# Patient Record
(demographics unavailable — no encounter records)

---

## 2024-11-22 NOTE — PHYSICAL EXAM
[Delayed in the Right Toes] : capillary refills normal in right toes [Delayed in the Left Toes] : capillary refills normal in the left toes [FreeTextEntry3] : Hair growth noted on digits. Proximal to distal cooling is within normal limits.  [FreeTextEntry1] : [place]-Pat monofilament testing absent at the hallux, 1st MPJ and heel bilateral.

## 2024-11-22 NOTE — ASSESSMENT
[FreeTextEntry1] : Impression: Diabetes with neuropathy. Skin fissures. Onychomycosis.   Treatment: I manually and mechanically debrided hallux mycotic nails using a small straight nail splitter and rotary kalen. The nails were aggressively debrided and debulked to make them comfortable in shoe gear. I applied Naftin gel and encouraged him to utilize OTC antifungals. I prepped the foot with alcohol and debrided fissures without incident. I discussed diabetic foot care and diabetic neuropathy and the fact that he is a fall risk. He is having some loose skin eye issues, so I gave him a Plastics surgical referral. He is going to follow-up. He should use moisturizer daily and follow-up in the office for evaluation.

## 2024-11-22 NOTE — PHYSICAL EXAM
[Delayed in the Right Toes] : capillary refills normal in right toes [Delayed in the Left Toes] : capillary refills normal in the left toes [FreeTextEntry3] : Hair growth noted on digits. Proximal to distal cooling is within normal limits.  [FreeTextEntry1] : [place]-Pat monofilament testing absent at the hallux, 1st MPJ and heel bilateral.

## 2024-11-22 NOTE — PHYSICAL EXAM
[Delayed in the Right Toes] : capillary refills normal in right toes [Delayed in the Left Toes] : capillary refills normal in the left toes [FreeTextEntry3] : Hair growth noted on digits. Proximal to distal cooling is within normal limits.  [FreeTextEntry1] : [place]-Pat monofilament testing absent at the hallux, 1st MPJ and heel bilateral.

## 2024-11-22 NOTE — HISTORY OF PRESENT ILLNESS
[FreeTextEntry1] : Patient presents today for diabetic foot care. He has diabetic neuropathy. It is getting worse. He has fissures that are getting worse, dystrophic and mycotic nails.

## 2025-07-07 NOTE — PHYSICAL EXAM
[1+] : left foot dorsalis pedis 1+ [Vibration Dec.] : diminished vibratory sensation at the level of the toes [Position Sense Dec.] : diminished position sense at the level of the toes [Diminished Throughout Right Foot] : diminished sensation with monofilament testing throughout right foot [Diminished Throughout Left Foot] : diminished sensation with monofilament testing throughout left foot [Delayed in the Right Toes] : capillary refills normal in right toes [Delayed in the Left Toes] : capillary refills normal in the left toes [FreeTextEntry3] : Hair growth noted on digits. Proximal to distal cooling is within normal limits.  [FreeTextEntry1] : [place]-Pat monofilament testing absent at the hallux, 1st MPJ and heel bilateral.

## 2025-07-07 NOTE — HISTORY OF PRESENT ILLNESS
[FreeTextEntry1] : Patient presents today for diabetic foot care. He has hallux onychomycotic nails that are yellow, thick, brittle, with subungual debris and mycosis.  The patient's history and physical has been reviewed and verified with no changes.

## 2025-07-07 NOTE — PHYSICAL EXAM
[1+] : left foot dorsalis pedis 1+ [Vibration Dec.] : diminished vibratory sensation at the level of the toes [Position Sense Dec.] : diminished position sense at the level of the toes [Diminished Throughout Right Foot] : diminished sensation with monofilament testing throughout right foot [Diminished Throughout Left Foot] : diminished sensation with monofilament testing throughout left foot [Delayed in the Right Toes] : capillary refills normal in right toes [Delayed in the Left Toes] : capillary refills normal in the left toes [FreeTextEntry3] : Hair growth noted on digits. Proximal to distal cooling is within normal limits.  [FreeTextEntry1] : [place]-Pat monofilament testing absent at the hallux, 1st MPJ and heel bilateral.

## 2025-07-07 NOTE — ASSESSMENT
[FreeTextEntry1] : Impression: Diabetes with neuropathy. Onychomycosis.   Treatment: I manually and mechanically debrided hallux mycotic nails using a small straight nail splitter and rotary kalen. The nails were aggressively debrided and debulked without incident. I applied Naftin gel and encouraged him to utilize OTC antifungals.  He should use moisturizer daily and follow-up in the office for evaluation.